# Patient Record
Sex: MALE | Race: WHITE | Employment: STUDENT | URBAN - METROPOLITAN AREA
[De-identification: names, ages, dates, MRNs, and addresses within clinical notes are randomized per-mention and may not be internally consistent; named-entity substitution may affect disease eponyms.]

---
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
## 2024-02-18 NOTE — ED PROVIDER NOTES
Patient Seen in: [facility] Emergency Department      History     Chief Complaint   Patient presents with    Leg or Foot Injury     Stated Complaint: left knee injury    Subjective:   HPI    Patient is a 18-year-old male complaining of pain to his left knee.  He states he was playing a lacrosse game earlier today and twisted the knee.  He complains of pain to the lateral aspect of the left knee just distal to the joint itself.  No previous history of simple sprain but he has required surgery on his meniscus in this knee in the past.  Denies distal tingling or numbness    Objective:   Past Medical History:   Diagnosis Date    Visual impairment     CONTACTS/eyeglasses              Past Surgical History:   Procedure Laterality Date    OTHER      right arm surgery                Social History     Socioeconomic History    Marital status: Single   Tobacco Use    Smoking status: Never    Smokeless tobacco: Never   Substance and Sexual Activity    Alcohol use: No    Drug use: No              Review of Systems    Positive for stated complaint: left knee injury  Other systems are as noted in HPI.  Constitutional and vital signs reviewed.      All other systems reviewed and negative except as noted above.    Physical Exam     ED Triage Vitals   BP 02/17/24 1815 115/58   Pulse 02/17/24 1800 69   Resp 02/17/24 1815 22   Temp --    Temp src --    SpO2 02/17/24 1800 98 %   O2 Device 02/17/24 1808 None (Room air)       Current:/58   Pulse 83   Resp 22   Wt 81.6 kg   SpO2 97%   BMI 31.89 kg/m²         Physical Exam  HEENT: The pupils are equal round and react to light, oropharynx is clear, mucous membranes are moist.  Neck: Supple, full range of motion.  CV: Chest is clear to auscultation, no wheezes rales or rhonchi.  Cardiac exam normal S1-S2, no murmurs rubs or gallops.  Abdomen: Soft, nontender, nondistended.  Bowel sounds present throughout.  Extremities: Warm and well perfused.  Dermatologic exam: No rashes  or lesions.  Neurologic exam: Cranial nerves 2-12 grossly intact.    Orthopedic exam: No significant bony abnormality noted to the left knee no significant joint effusion.  Negative Lachman negative anterior drawer       ED Course   Labs Reviewed - No data to display          X-ray of the knee was done which I reviewed independently.  I see no evidence of significant bony abnormality or joint space abnormality.    XR KNEE (3 VIEWS), LEFT (CPT=73562)    Result Date: 2/17/2024  PROCEDURE:  XR KNEE ROUTINE (3 VIEWS), LEFT (CPT=73562)  TECHNIQUE:  Three views were obtained including patellar view.  COMPARISON:  None.  INDICATIONS:  Lateral knee pain.  PATIENT STATED HISTORY: (As transcribed by Technologist)  Left knee sport related injury. Lateral pain.     FINDINGS:  No acute fracture or dislocation.  Joint spaces are maintained.  No significant joint effusion.            CONCLUSION:  No acute osseous findings.   LOCATION:  [facility]   Dictated by (CST): Syd Reynolds MD on 2/17/2024 at 6:35 PM     Finalized by (CST): Syd Reynolds MD on 2/17/2024 at 6:36 PM            Patient was put in an Ace wrap.  Patient has his own crutches and will use those at home.  He is given follow-up for orthopedics patient presents with knee injury.  Differential considered includes sprain versus dislocation or break.  X-rays reassuring.  He will follow as directed and return for worsening of symptoms.  He is advised to use Motrin and ice as needed.             MDM      Further workup with MRI knee considered.    Patient was screened and evaluated during this visit.   As a treating physician attending to the patient, I determined, within reasonable clinical confidence and prior to discharge, that an emergency medical condition was not or was no longer present.  There was no indication for further evaluation, treatment or admission on an emergency basis.  Comprehensive verbal and written discharge and follow-up instructions were provided to  help prevent relapse or worsening.  Patient was instructed to follow-up with the primary care provider for further evaluation and treatment, but to return immediately to the ER for worsening, concerning, new, changing or persisting symptoms.  I discussed the case with the patient/parent and they had no questions, complaints, or concerns.  Patient/parent felt comfortable going home.                                   Medical Decision Making      Disposition and Plan     Clinical Impression:  1. Sprain of left knee, unspecified ligament, initial encounter         Disposition:  Discharge  2/17/2024  6:40 pm    Follow-up:  Luis Triana MD  [facility] [place]   [address] [place] [id number]  [phone]    Follow up  As needed          Medications Prescribed:  Discharge Medication List as of 2/17/2024  7:31 PM

## 2024-10-22 NOTE — ED INITIAL ASSESSMENT (HPI)
Pt's nose was hit by another person's elbow while playing basketball today approx 30 min ago, pt denies LOC, reports his nose started bleeding after, no active bleeding at this time

## 2024-10-22 NOTE — ED PROVIDER NOTES
Patient Seen in: [facility] Emergency Department      History     Chief Complaint   Patient presents with    Trauma    Nasal Fracture     Stated Complaint: elbow to nose    Subjective:   19-year-old healthy male presents with traumatic nasal injury sustained just prior to arrival in the ED.  Patient was elbowed by his friend during basketball causing pain and swelling and some mild bleeding from the nose.  No reported LOC, head injury, vomiting, neck pain, facial or jaw pain or other notable injuries.  Patient states that he is able to breathe from both nostrils without any difficulty.              Objective:     Past Medical History:    Visual impairment    CONTACTS/eyeglasses              Past Surgical History:   Procedure Laterality Date    Other      right arm surgery                Social History     Socioeconomic History    Marital status: Single   Tobacco Use    Smoking status: Never    Smokeless tobacco: Never   Substance and Sexual Activity    Alcohol use: No    Drug use: No                  Physical Exam     ED Triage Vitals [10/22/24 1543]   BP (!) 161/99   Pulse 82   Resp 18   Temp 97.2 °F (36.2 °C)   Temp src Temporal   SpO2 98 %   O2 Device None (Room air)       Current Vitals:   Vital Signs  BP: 129/71  Pulse: 80  Resp: 18  Temp: 97.8 °F (36.6 °C)  Temp src: Temporal    Oxygen Therapy  SpO2: 98 %  O2 Device: None (Room air)        Physical Exam  Vitals and nursing note reviewed.   Constitutional:       Appearance: Normal appearance.   HENT:      Head: Normocephalic and atraumatic.      Jaw: There is normal jaw occlusion. No trismus, tenderness, swelling or malocclusion.      Nose: Nasal deformity, signs of injury and nasal tenderness present. No septal deviation or laceration.      Right Nostril: No foreign body, epistaxis, septal hematoma or occlusion.      Left Nostril: No foreign body, epistaxis, septal hematoma or occlusion.        Comments: Dried blood in both nostrils    Bruising and  swelling noted over the nasal bridge, no obvious deviation    No septal hematoma or active epistaxis     Mouth/Throat:      Mouth: Mucous membranes are moist.      Pharynx: Oropharynx is clear.   Eyes:      Extraocular Movements: Extraocular movements intact.      Conjunctiva/sclera: Conjunctivae normal.      Pupils: Pupils are equal, round, and reactive to light.   Cardiovascular:      Rate and Rhythm: Normal rate.      Pulses: Normal pulses.   Pulmonary:      Effort: Pulmonary effort is normal.   Musculoskeletal:         General: Normal range of motion.      Cervical back: Normal range of motion and neck supple. No tenderness.   Skin:     General: Skin is warm.      Capillary Refill: Capillary refill takes less than 2 seconds.   Neurological:      General: No focal deficit present.      Mental Status: He is alert and oriented to person, place, and time.      Cranial Nerves: No cranial nerve deficit.      Sensory: No sensory deficit.             ED Course   Labs Reviewed - No data to display    ED Course as of 10/22/24 1629  ------------------------------------------------------------  Time: 10/22 1609  Comment: Nasal bone x-rays with nondisplaced bilateral nasal bone fracture.  Will provide outpatient follow-up information with ENT.       Assessment & Plan: Very well-appearing with nasal injury.  X-rays ordered from triage.  Will provide a dose of ibuprofen.     Independent historian:   Pertinent co-morbidities affecting presentation:   Differential diagnoses considered: I considered various etiologies / differetial diagosis including but not limited to, nasal contusion, fracture. The patient was well-appearing and did not show any evidence of serious bacterial infection.  Diagnostic tests considered but not performed: Facial CT -low suspicion for other facial fractures    ED Course:    Prescription drug management considerations:   Consideration regarding hospitalization or escalation of care: None at this  time  Social determinants of health: None       I have considered other serious etiologies for this patient's complaints, however the presentation is not consistent with such entities. Patient was screened and evaluated during this visit.   As a treating physician attending to the patient, I determined, within reasonable clinical confidence and prior to discharge, that an emergency medical condition was not or was no longer present. Patient or caregiver understands the course of events that occurred in the emergency department. Instructions when to seek emergent medical care was reviewed. Advised parent or caregiver to follow up with primary care physician.        This report has been produced using speech recognition software and may contain errors related to that system including, but not limited to, errors in grammar, punctuation, and spelling, as well as words and phrases that possibly may have been recognized inappropriately.  If there are any questions or concerns, contact the dictating provider for clarification.         MDM      Radiology:  Imaging ordered independently visualized and interpreted by myself (along with review of radiologist's interpretation) and noted the following: Nasal bone x-rays with notable fracture without significant displacement    XR NASAL BONES, COMPLETE (MIN 3 VIEWS) (CPT=70160)    Result Date: 10/22/2024  CONCLUSION:  Possible nondisplaced bilateral nasal bone fractures.    LOCATION:  [facility]    Dictated by (CST): Syd Reynolds MD on 10/22/2024 at 4:03 PM     Finalized by (CST): Syd Reynolds MD on 10/22/2024 at 4:04 PM        Labs:  ^^ Personally ordered, reviewed, and interpreted all unique tests ordered.  Clinically significant labs noted:     Medications administered:  Medications   ibuprofen (Motrin) tab 800 mg (has no administration in time range)       Pulse oximetry:  Pulse oximetry on room air is 98% and is normal.     Cardiac monitoring:  Initial heart rate is 80 and  is normal for age    Vital signs:  Vitals:    10/22/24 1543 10/22/24 1602   BP: (!) 161/99 129/71   Pulse: 82 80   Resp: 18 18   Temp: 97.2 °F (36.2 °C) 97.8 °F (36.6 °C)   TempSrc: Temporal Temporal   SpO2: 98% 98%   Weight: 90.7 kg 95.1 kg   Height: 162.6 cm (5' 4\")        Chart review:  ^^ Review of prior external notes from unique sources (non-[facility] ED records): noted in history : None       Disposition and Plan     Clinical Impression:  1. Closed fracture of nasal bone, initial encounter    2. Mild epistaxis         Disposition:  Discharge  10/22/2024  4:25 pm    Follow-up:  Julio Cesar Villalba MD  [address]  [address]  [facility] [id number] [phone]    Schedule an appointment as soon as possible for a visit            Medications Prescribed:  Current Discharge Medication List              Supplementary Documentation:

## 2024-10-24 NOTE — PROGRESS NOTES
Julio Cesar Elizabeth is a 19 year old male.    Chief Complaint   Patient presents with    Nose Problem     Patient is here due to broken nose 10/ 22/24. Patient reports no pain at this time. Reports there is slight pain when pressure is applied.        HISTORY OF PRESENT ILLNESS  He presents with a history of injuring his nose playing basketball.  Received an elbow to the left side of his nose.  No pain at this time had some bleeding at the time of the injury went to [facility] ER had an x-ray done demonstrating possible fractures.  He is unsure if there is any abnormality in the appearance of his nose at this time.  He does relate a previous nasal injury when he was 4 that may have caused external deformity.      Social History     Socioeconomic History    Marital status: Single   Tobacco Use    Smoking status: Never    Smokeless tobacco: Never   Substance and Sexual Activity    Alcohol use: No    Drug use: No       Family History   Family history unknown: Yes       Past Medical History:    Visual impairment    CONTACTS/eyeglasses       Past Surgical History:   Procedure Laterality Date    Other      right arm surgery         REVIEW OF SYSTEMS    System Neg/Pos Details   Constitutional Negative Fatigue, fever and weight loss.   ENMT Negative Drooling.   Eyes Negative Blurred vision and vision changes.   Respiratory Negative Dyspnea and wheezing.   Cardio Negative Chest pain, irregular heartbeat/palpitations and syncope.   GI Negative Abdominal pain and diarrhea.   Endocrine Negative Cold intolerance and heat intolerance.   Neuro Negative Tremors.   Psych Negative Anxiety and depression.   Integumentary Negative Frequent skin infections, pigment change and rash.   Hema/Lymph Negative Easy bleeding and easy bruising.           PHYSICAL EXAM    There were no vitals taken for this visit.       Constitutional Normal Overall appearance - Normal.   Psychiatric Normal Orientation - Oriented to time, place, person &  situation. Appropriate mood and affect.   Neck Exam Normal Inspection - Normal. Palpation - Normal. Parotid gland - Normal. Thyroid gland - Normal.   Eyes Normal Conjunctiva - Right: Normal, Left: Normal. Pupil - Right: Normal, Left: Normal. Fundus - Right: Normal, Left: Normal.   Neurological Normal Memory - Normal. Cranial nerves - Cranial nerves II through XII grossly intact.   Head/Face Normal Facial features - Normal. Eyebrows - Normal. Skull - Normal.        Nasopharynx Normal External nose - Normal. Lips/teeth/gums - Normal. Tonsils - Normal. Oropharynx - Normal.   Ears Normal Inspection - Right: Normal, Left: Normal. Canal - Right: Normal, Left: Normal. TM - Right: Normal, Left: Normal.   Skin Normal Inspection - Normal.        Lymph Detail Normal Submental. Submandibular. Anterior cervical. Posterior cervical. Supraclavicular.        Nose/Mouth/Throat Normal External nose -slight depression left nasal bone lips/teeth/gums - Normal. Tonsils - Normal. Oropharynx - Normal.   Nose/Mouth/Throat Normal Nares - Right: Normal Left: Normal. Septum -Normal  Turbinates - Right: Normal, Left: Normal.       Current Outpatient Medications:     methylPREDNISolone 4 MG Oral Tablet Therapy Pack, Take by oral route., Disp: 21 tablet, Rfl: 0    ibuprofen 200 MG Oral Tab, Take 1 tablet (200 mg total) by mouth every 6 (six) hours as needed for Pain., Disp: , Rfl:     HYDROcodone-acetaminophen (NORCO) 5-325 MG Oral Tab, Take 1-2 tablets by mouth every 4 to 6 hours as needed for Pain. (Patient not taking: Reported on 3/27/2023), Disp: 60 tablet, Rfl: 0    aspirin 81 MG Oral Tab, Take 1 tablet (81 mg total) by mouth daily. (Patient not taking: Reported on 3/27/2023), Disp: 30 tablet, Rfl: 0    docusate sodium (COLACE) 100 MG Oral Cap, Take 1 capsule (100 mg total) by mouth 2 (two) times daily as needed for constipation. (Patient not taking: Reported on 3/27/2023), Disp: 30 capsule, Rfl: 0    Ondansetron HCl (ZOFRAN) 4 mg tablet,  Take 1 tablet (4 mg total) by mouth every 8 (eight) hours as needed for Nausea. (Patient not taking: Reported on 3/27/2023), Disp: 10 tablet, Rfl: 1  ASSESSMENT AND PLAN    1. Closed fracture of nasal bone, initial encounter  May have a slight depression of the left nasal bone but hard to tell as he has had a previous injury in the past when he was much younger.  I did recommend starting him on some steroids with a Medrol Dosepak and that he undergo formal CT scan of sinuses to evaluate for a closed nasal fracture.  We did review his x-ray which demonstrates possible fracture but inconclusive.  Return to see me after scan.  - CT SINUS (CPT=70486); Future        This note was prepared using Dragon Medical voice recognition dictation software. As a result errors may occur. When identified these errors have been corrected. While every attempt is made to correct errors during dictation discrepancies may still exist    Jonatna Martin MD    10/24/2024    3:12 PM

## 2024-11-01 NOTE — PROGRESS NOTES
Julio Cesar Elizabeth is a 19 year old male.    Chief Complaint   Patient presents with    Follow - Up     Patient is here to for closed fracture of nasal bones follow up and to review CT of sinus        HISTORY OF PRESENT ILLNESS  He presents with a history of injuring his nose playing basketball.  Received an elbow to the left side of his nose.  No pain at this time had some bleeding at the time of the injury went to [facility] had an x-ray done demonstrating possible fractures.  He is unsure if there is any abnormality in the appearance of his nose at this time.  He does relate a previous nasal injury when he was 4 that may have caused external deformity.     11/1/24 here to go over the results of his CT scan of his sinuses.  History of nasal trauma about a week ago.  CT scan images were reviewed with patient demonstrating deviation of septum to the right as well as nasal fracture of the left nasal bones primarily.  Here to discuss future management.      Social History     Socioeconomic History    Marital status: Single   Tobacco Use    Smoking status: Never    Smokeless tobacco: Never   Substance and Sexual Activity    Alcohol use: No    Drug use: No       Family History   Family history unknown: Yes       Past Medical History:    Visual impairment    CONTACTS/eyeglasses       Past Surgical History:   Procedure Laterality Date    Other      right arm surgery         REVIEW OF SYSTEMS    System Neg/Pos Details   Constitutional Negative Fatigue, fever and weight loss.   ENMT Negative Drooling.   Eyes Negative Blurred vision and vision changes.   Respiratory Negative Dyspnea and wheezing.   Cardio Negative Chest pain, irregular heartbeat/palpitations and syncope.   GI Negative Abdominal pain and diarrhea.   Endocrine Negative Cold intolerance and heat intolerance.   Neuro Negative Tremors.   Psych Negative Anxiety and depression.   Integumentary Negative Frequent skin infections, pigment change and rash.    Hema/Lymph Negative Easy bleeding and easy bruising.           PHYSICAL EXAM    There were no vitals taken for this visit.       Constitutional Normal Overall appearance - Normal.   Psychiatric Normal Orientation - Oriented to time, place, person & situation. Appropriate mood and affect.   Neck Exam Normal Inspection - Normal. Palpation - Normal. Parotid gland - Normal. Thyroid gland - Normal.   Eyes Normal Conjunctiva - Right: Normal, Left: Normal. Pupil - Right: Normal, Left: Normal. Fundus - Right: Normal, Left: Normal.   Neurological Normal Memory - Normal. Cranial nerves - Cranial nerves II through XII grossly intact.   Head/Face Normal Facial features - Normal. Eyebrows - Normal. Skull - Normal.        Nasopharynx Normal External nose - Normal. Lips/teeth/gums - Normal. Tonsils - Normal. Oropharynx - Normal.   Ears Normal Inspection - Right: Normal, Left: Normal. Canal - Right: Normal, Left: Normal. TM - Right: Normal, Left: Normal.   Skin Normal Inspection - Normal.        Lymph Detail Normal Submental. Submandibular. Anterior cervical. Posterior cervical. Supraclavicular.        Nose/Mouth/Throat Normal External nose - Normal. Lips/teeth/gums - Normal. Tonsils - Normal. Oropharynx - Normal.   Nose/Mouth/Throat Normal Nares - Right: Normal Left: Normal. Septum -deviated to the right.  Turbinates - Right: Normal, Left: Normal.       Current Outpatient Medications:     methylPREDNISolone 4 MG Oral Tablet Therapy Pack, Take by oral route., Disp: 21 tablet, Rfl: 0    ibuprofen 200 MG Oral Tab, Take 1 tablet (200 mg total) by mouth every 6 (six) hours as needed for Pain., Disp: , Rfl:     HYDROcodone-acetaminophen (NORCO) 5-325 MG Oral Tab, Take 1-2 tablets by mouth every 4 to 6 hours as needed for Pain. (Patient not taking: Reported on 3/27/2023), Disp: 60 tablet, Rfl: 0    aspirin 81 MG Oral Tab, Take 1 tablet (81 mg total) by mouth daily. (Patient not taking: Reported on 3/27/2023), Disp: 30 tablet, Rfl: 0     docusate sodium (COLACE) 100 MG Oral Cap, Take 1 capsule (100 mg total) by mouth 2 (two) times daily as needed for constipation. (Patient not taking: Reported on 3/27/2023), Disp: 30 capsule, Rfl: 0    Ondansetron HCl (ZOFRAN) 4 mg tablet, Take 1 tablet (4 mg total) by mouth every 8 (eight) hours as needed for Nausea. (Patient not taking: Reported on 3/27/2023), Disp: 10 tablet, Rfl: 1  ASSESSMENT AND PLAN    1. Closed fracture of nasal bone, initial encounter  Closed fracture of nasal bone on the left.  Depressed on exam and on CT scan.  I did recommend closed reduction of this nasal fracture under general anesthesia and we discussed the risk of surgery to include but not be limited to postoperative pain, bleeding as well as anesthesia use.  He accepts these risks and wishes to proceed.  Please note that the septal deviation will be addressed in the future if necessary.        This note was prepared using Dragon Medical voice recognition dictation software. As a result errors may occur. When identified these errors have been corrected. While every attempt is made to correct errors during dictation discrepancies may still exist    Jonatan Martin MD    11/1/2024    11:34 AM

## 2024-11-08 NOTE — TELEPHONE ENCOUNTER
Post op day #2 nasal closed reduction fracture.    Per patient, is doing well, eating and drinking well,  Has not needed to take pain medication, no pain,  No bleeding, no fever, no drainage, can use afrin for nasal congestion first 5 days only, saline nasal spray as directed,has thermoplastic splint in place until sees MD, do not blow nose, do not pick, prod, or poke nose, no heavy lifting over a gallon of milk, no bending, sneeze or cough with mouth open, to call office if any unusual drainage, severe bleeding (greater than 4 mustache dressings in one hour), fever over 101 F., uncontrolled pain or any concerning symptoms. Verbalized understanding.  Appt made post op.   Future Appointments   Date Time Provider Department Center   11/14/2024  9:30 AM Jonatan Martin MD [facility]

## 2024-11-14 NOTE — PROGRESS NOTES
Julio Cesar Elizabeth is a 19 year old male.    Chief Complaint   Patient presents with    Post-Op     Patient is here due to NASAL CLOSED REDUCTION FRACTURE Post op       HISTORY OF PRESENT ILLNESS  He presents with a history of injuring his nose playing basketball.  Received an elbow to the left side of his nose.  No pain at this time had some bleeding at the time of the injury went to [facility] had an x-ray done demonstrating possible fractures.  He is unsure if there is any abnormality in the appearance of his nose at this time.  He does relate a previous nasal injury when he was 4 that may have caused external deformity.     11/1/24 here to go over the results of his CT scan of his sinuses.  History of nasal trauma about a week ago.  CT scan images were reviewed with patient demonstrating deviation of septum to the right as well as nasal fracture of the left nasal bones primarily.  Here to discuss future management.      11/14/24 doing very well no complaints or concerns here to have his nasal splint removed after closed reduction of a nasal fracture 8 days ago.  He feels that he is actually breathing better already.      Social History     Socioeconomic History    Marital status: Single   Tobacco Use    Smoking status: Never    Smokeless tobacco: Never   Vaping Use    Vaping status: Never Used   Substance and Sexual Activity    Alcohol use: Yes     Comment: SOCIALLY    Drug use: No       Family History   Family history unknown: Yes       Past Medical History:    Visual impairment    CONTACTS/eyeglasses       Past Surgical History:   Procedure Laterality Date    Other      right arm surgery         REVIEW OF SYSTEMS    System Neg/Pos Details   Constitutional Negative Fatigue, fever and weight loss.   ENMT Negative Drooling.   Eyes Negative Blurred vision and vision changes.   Respiratory Negative Dyspnea and wheezing.   Cardio Negative Chest pain, irregular heartbeat/palpitations and syncope.   GI  Negative Abdominal pain and diarrhea.   Endocrine Negative Cold intolerance and heat intolerance.   Neuro Negative Tremors.   Psych Negative Anxiety and depression.   Integumentary Negative Frequent skin infections, pigment change and rash.   Hema/Lymph Negative Easy bleeding and easy bruising.           PHYSICAL EXAM    There were no vitals taken for this visit.       Constitutional Normal Overall appearance - Normal.   Psychiatric Normal Orientation - Oriented to time, place, person & situation. Appropriate mood and affect.   Neck Exam Normal Inspection - Normal. Palpation - Normal. Parotid gland - Normal. Thyroid gland - Normal.   Eyes Normal Conjunctiva - Right: Normal, Left: Normal. Pupil - Right: Normal, Left: Normal. Fundus - Right: Normal, Left: Normal.   Neurological Normal Memory - Normal. Cranial nerves - Cranial nerves II through XII grossly intact.   Head/Face Normal Facial features - Normal. Eyebrows - Normal. Skull - Normal.        Nasopharynx Normal External nose - Normal. Lips/teeth/gums - Normal. Tonsils - Normal. Oropharynx - Normal.   Ears Normal Inspection - Right: Normal, Left: Normal. Canal - Right: Normal, Left: Normal. TM - Right: Normal, Left: Normal.   Skin Normal Inspection - Normal.        Lymph Detail Normal Submental. Submandibular. Anterior cervical. Posterior cervical. Supraclavicular.        Nose/Mouth/Throat Normal External nose - Normal. Lips/teeth/gums - Normal. Tonsils - Normal. Oropharynx - Normal.   Nose/Mouth/Throat Normal Nares - Right: Normal Left: Normal. Septum -Normal  Turbinates - Right: Normal, Left: Normal.       Current Outpatient Medications:     HYDROcodone-acetaminophen (NORCO) 5-325 MG Oral Tab, Take 1 tablet by mouth every 8 (eight) hours as needed for Pain., Disp: 15 tablet, Rfl: 0    HYDROcodone-acetaminophen (NORCO) 5-325 MG Oral Tab, Take 1-2 tablets by mouth every 4 to 6 hours as needed for Pain., Disp: 60 tablet, Rfl: 0  ASSESSMENT AND PLAN    1. Closed  fracture of nasal bone, initial encounter  + Midline happy with the results of his closed reduction.  Actually breathing better.  We discussed the fact that the nasal bones are still healing and will take about 10 weeks to be as strong as they were before the initial injury.  His a mask from playing basketball and a cage when playing lacrosse.  Return to see me as needed        This note was prepared using [employer] voice recognition dictation software. As a result errors may occur. When identified these errors have been corrected. While every attempt is made to correct errors during dictation discrepancies may still exist    Jonatan Martin MD    11/14/2024    10:11 AM

## (undated) DEVICE — GAMMEX® NON-LATEX PI ORTHO SIZE 7.5, STERILE POLYISOPRENE POWDER-FREE SURGICAL GLOVE: Brand: GAMMEX

## (undated) DEVICE — 3M™ STERI-DRAPE™ U-DRAPE 1015: Brand: STERI-DRAPE™

## (undated) DEVICE — Device: Brand: JELCO

## (undated) DEVICE — BLADE SHVR 13CM 4MM EXCLBR

## (undated) DEVICE — ZIMMER® STERILE DISPOSABLE TOURNIQUET CUFF WITH PLC, DUAL PORT, SINGLE BLADDER, 30 IN. (76 CM)

## (undated) DEVICE — SOL  .9 3000ML

## (undated) DEVICE — SUTURE ETHILON 3-0 669H

## (undated) DEVICE — TUBING IRR 16FT CNT WV 3 ASCP

## (undated) DEVICE — SUCTION CANISTER, 3000CC,SAFELINER: Brand: DEROYAL

## (undated) DEVICE — WEBRIL COTTON UNDERCAST PADDING: Brand: WEBRIL

## (undated) DEVICE — DRAPE ARTHROSCOPY KNEE

## (undated) DEVICE — DRAPE SRG 70X60IN SPLT U IMPRV

## (undated) DEVICE — ABDOMINAL PAD: Brand: CURITY

## (undated) DEVICE — ARTHROSCOPY: Brand: MEDLINE INDUSTRIES, INC.

## (undated) DEVICE — GAMMEX® NON-LATEX PI ORTHO SIZE 8, STERILE POLYISOPRENE POWDER-FREE SURGICAL GLOVE: Brand: GAMMEX

## (undated) DEVICE — CHLORAPREP 26ML APPLICATOR

## (undated) NOTE — LETTER
3/27/2023          To Whom It May Concern:    Melissa Menezes is currently under my medical care and may not return to school sports at this time. Please excuse Major Carvajal for 7 days. He may return to school sports on 04/03/23. Activity is restricted as follows: none. If you require additional information please contact our office.         Sincerely,    Clau Cleveland MD          Document generated by:  Clau Cleveland MD